# Patient Record
Sex: FEMALE | Race: WHITE | NOT HISPANIC OR LATINO | URBAN - METROPOLITAN AREA
[De-identification: names, ages, dates, MRNs, and addresses within clinical notes are randomized per-mention and may not be internally consistent; named-entity substitution may affect disease eponyms.]

---

## 2017-09-18 ENCOUNTER — IMPORTED ENCOUNTER (OUTPATIENT)
Dept: URBAN - METROPOLITAN AREA CLINIC 50 | Facility: CLINIC | Age: 72
End: 2017-09-18

## 2017-09-19 ENCOUNTER — IMPORTED ENCOUNTER (OUTPATIENT)
Dept: URBAN - METROPOLITAN AREA CLINIC 50 | Facility: CLINIC | Age: 72
End: 2017-09-19

## 2019-07-08 ENCOUNTER — IMPORTED ENCOUNTER (OUTPATIENT)
Dept: URBAN - METROPOLITAN AREA CLINIC 50 | Facility: CLINIC | Age: 74
End: 2019-07-08

## 2020-04-20 ENCOUNTER — IMPORTED ENCOUNTER (OUTPATIENT)
Dept: URBAN - METROPOLITAN AREA CLINIC 50 | Facility: CLINIC | Age: 75
End: 2020-04-20

## 2020-04-21 ENCOUNTER — IMPORTED ENCOUNTER (OUTPATIENT)
Dept: URBAN - METROPOLITAN AREA CLINIC 50 | Facility: CLINIC | Age: 75
End: 2020-04-21

## 2020-06-05 ENCOUNTER — IMPORTED ENCOUNTER (OUTPATIENT)
Dept: URBAN - METROPOLITAN AREA CLINIC 50 | Facility: CLINIC | Age: 75
End: 2020-06-05

## 2020-09-23 NOTE — PATIENT DISCUSSION
Surgery Counseling: I have discussed the option of scheduling surgery versus following, as well as the risks, benefits and alternatives of cataract surgery with the patient. It was explained that the surgery is medically indicated at this time, and it can be performed at the patient's option as delaying will cause no further deterioration, therefore there is no rush and there is no harm in waiting to have surgery. It was also explained that there is no guarantee that removing the cataract will improve their vision. The patient understands and desires to proceed with cataract surgery with the implantation of an intraocular lens to improve vision for __driving / reading__. I have given the patient the prescribed regimen of the all-in-one drop to use before and after cataract surgery. They have elected to use the all-in-one option of Pred/Gati/Brom(prednisolone acetate,gatifloxacin,and bromfenac. Patient to administer as directed.

## 2020-09-23 NOTE — PATIENT DISCUSSION
CATARACTS, OU - VISUALLY SIGNIFICANT. SCHEDULE _OS_ FIRST THEN LATER IN _OD_ DISCUSSED OPTION OF __STANDARD OU___VS___TORIC OS STANDARD OD. PATIENT UNDERSTANDS AND DESIRES ___TORIC OS AND STANDARD OD ___.

## 2020-10-20 NOTE — PATIENT DISCUSSION
Pre-Op 2nd Eye Counseling: The patient has noticed an improvement in their visual symptoms in the operative eye. The patient complains of decreased vision in the fellow eye when reading, driving and watching TV_. It was explained to the patient that the decision to proceed with cataract surgery in the fellow eye is entirely a separate decision from the surgical eye. All of the same risks, benefits and alternatives are reviewed with the patient again. The patient does feel the vision in the non-operative eye is limiting their daily activities and elects to proceed with cataract surgery in the _right______ eye. . I have given the patient the prescribed regimen of  drops to use before and after cataract surgery. Patient to administer as directed.

## 2020-10-20 NOTE — PATIENT DISCUSSION
S/P PE IOL, OS___. DOING WELL. CONTINUE PRED-GATI-BROM IN THE SURGICAL EYE  FOR A TOTAL OF 3 WEEKS USE THEN DISCONTINUE. PATIENT DESIRES _TORIC_IOL FOR 2ND EYE. SCHEDULE CATARACT SURGERY.

## 2021-04-18 ASSESSMENT — TONOMETRY
OS_IOP_MMHG: 13
OD_IOP_MMHG: 13
OD_IOP_MMHG: 12
OS_IOP_MMHG: 12
OD_IOP_MMHG: 12
OS_IOP_MMHG: 15
OD_IOP_MMHG: 15
OS_IOP_MMHG: 11

## 2021-04-18 ASSESSMENT — VISUAL ACUITY
OD_CC: J1
OS_CC: J1
OS_CC: J1@ 16 IN
OD_CC: 20/25
OD_OTHER: 20/40. 20/40.
OS_BAT: 20/20
OS_CC: 20/25-3
OD_OTHER: 20/25. 20/30.
OD_CC: 20/25-3
OD_BAT: 20/40
OD_CC: 20/30+
OS_CC: 20/25
OD_OTHER: 20/50. 20/70.
OS_BAT: 20/30
OS_OTHER: 20/20. 20/25.
OD_BAT: 20/50
OS_CC: 20/30+
OD_BAT: 20/25
OD_CC: J1@ 16 IN
OS_OTHER: 20/30. 20/50.
OS_OTHER: >20/400.
OS_BAT: >20/400
OS_CC: 20/20-1
OD_CC: 20/30

## 2021-08-26 ENCOUNTER — PREPPED CHART (OUTPATIENT)
Dept: URBAN - METROPOLITAN AREA CLINIC 52 | Facility: CLINIC | Age: 76
End: 2021-08-26

## 2021-08-27 ENCOUNTER — ANNUAL COMPREHENSIVE EXAM (OUTPATIENT)
Dept: URBAN - METROPOLITAN AREA CLINIC 52 | Facility: CLINIC | Age: 76
End: 2021-08-27

## 2021-08-27 DIAGNOSIS — H02.834: ICD-10-CM

## 2021-08-27 DIAGNOSIS — H43.813: ICD-10-CM

## 2021-08-27 DIAGNOSIS — H57.813: ICD-10-CM

## 2021-08-27 DIAGNOSIS — H25.13: ICD-10-CM

## 2021-08-27 DIAGNOSIS — H02.831: ICD-10-CM

## 2021-08-27 DIAGNOSIS — H35.361: ICD-10-CM

## 2021-08-27 PROCEDURE — 92134 CPTRZ OPH DX IMG PST SGM RTA: CPT

## 2021-08-27 PROCEDURE — 92014 COMPRE OPH EXAM EST PT 1/>: CPT

## 2021-08-27 ASSESSMENT — VISUAL ACUITY
OD_GLARE: 20/80
OS_GLARE: >20/400
OD_CC: 20/30-
OS_GLARE: 20/40
OD_PH: 20/25-
OS_CC: 20/30-
OD_GLARE: 20/40
OU_CC: J1+/-2

## 2021-08-27 ASSESSMENT — TONOMETRY
OS_IOP_MMHG: 14
OD_IOP_MMHG: 14

## 2022-06-16 ENCOUNTER — ESTABLISHED PATIENT (OUTPATIENT)
Dept: URBAN - METROPOLITAN AREA CLINIC 49 | Facility: CLINIC | Age: 77
End: 2022-06-16

## 2022-06-16 DIAGNOSIS — H10.13: ICD-10-CM

## 2022-06-16 DIAGNOSIS — H35.62: ICD-10-CM

## 2022-06-16 DIAGNOSIS — H16.223: ICD-10-CM

## 2022-06-16 DIAGNOSIS — H25.13: ICD-10-CM

## 2022-06-16 PROCEDURE — 92012 INTRM OPH EXAM EST PATIENT: CPT

## 2022-06-16 RX ORDER — OLOPATADINE HYDROCHLORIDE 2 MG/ML: 1 SOLUTION OPHTHALMIC ONCE A DAY

## 2022-06-16 ASSESSMENT — VISUAL ACUITY
OS_PH: 20/25
OS_CC: 20/30
OD_CC: 20/30
OD_PH: 20/25

## 2022-06-16 ASSESSMENT — TONOMETRY
OD_IOP_MMHG: 12
OS_IOP_MMHG: 13

## 2022-06-16 NOTE — PATIENT DISCUSSION
Patchy sub conj heme 4-6 oclock and 9-8 oclock at todays visit. Discussed that it will get worse before it gets better.

## 2023-04-17 ENCOUNTER — COMPREHENSIVE EXAM (OUTPATIENT)
Dept: URBAN - METROPOLITAN AREA CLINIC 49 | Facility: CLINIC | Age: 78
End: 2023-04-17

## 2023-04-17 DIAGNOSIS — H35.361: ICD-10-CM

## 2023-04-17 DIAGNOSIS — H35.54: ICD-10-CM

## 2023-04-17 DIAGNOSIS — H25.13: ICD-10-CM

## 2023-04-17 PROCEDURE — 92014 COMPRE OPH EXAM EST PT 1/>: CPT

## 2023-04-17 PROCEDURE — 92134 CPTRZ OPH DX IMG PST SGM RTA: CPT

## 2023-04-17 ASSESSMENT — VISUAL ACUITY
OD_GLARE: 20/60
OS_GLARE: 20/50
OU_CC: J2 @ 16"
OS_CC: 20/25
OD_GLARE: 20/40
OD_CC: 20/25-1

## 2023-04-17 ASSESSMENT — TONOMETRY
OS_IOP_MMHG: 12
OD_IOP_MMHG: 12

## 2024-04-24 ENCOUNTER — COMPREHENSIVE EXAM (OUTPATIENT)
Dept: URBAN - METROPOLITAN AREA CLINIC 49 | Facility: LOCATION | Age: 79
End: 2024-04-24

## 2024-04-24 DIAGNOSIS — H18.593: ICD-10-CM

## 2024-04-24 DIAGNOSIS — H25.13: ICD-10-CM

## 2024-04-24 DIAGNOSIS — H52.4: ICD-10-CM

## 2024-04-24 DIAGNOSIS — H43.813: ICD-10-CM

## 2024-04-24 DIAGNOSIS — H35.361: ICD-10-CM

## 2024-04-24 DIAGNOSIS — H02.834: ICD-10-CM

## 2024-04-24 DIAGNOSIS — H02.831: ICD-10-CM

## 2024-04-24 PROCEDURE — 99214 OFFICE O/P EST MOD 30 MIN: CPT

## 2024-04-24 PROCEDURE — 92015 DETERMINE REFRACTIVE STATE: CPT

## 2024-04-24 PROCEDURE — 92134 CPTRZ OPH DX IMG PST SGM RTA: CPT

## 2024-04-24 ASSESSMENT — VISUAL ACUITY
OD_PH: 20/30
OS_CC: 20/40-1
OD_CC: 20/50-2
OS_GLARE: 20/50
OD_GLARE: 20/40
OS_GLARE: 20/60
OU_CC: J2 @ 15IN
OD_GLARE: 20/60-1

## 2024-04-24 ASSESSMENT — TONOMETRY
OD_IOP_MMHG: 13
OS_IOP_MMHG: 14

## 2024-05-07 ENCOUNTER — PRE-OP/H&P (OUTPATIENT)
Dept: URBAN - METROPOLITAN AREA CLINIC 49 | Facility: LOCATION | Age: 79
End: 2024-05-07

## 2024-05-07 VITALS — HEIGHT: 55 IN

## 2024-05-07 DIAGNOSIS — H25.813: ICD-10-CM

## 2024-05-07 PROCEDURE — 99214 OFFICE O/P EST MOD 30 MIN: CPT

## 2024-05-07 PROCEDURE — 92136 OPHTHALMIC BIOMETRY: CPT

## 2024-05-07 PROCEDURE — 92025IOL CORNEAL TOPOGRAPHY PREMIUM IOL

## 2024-05-07 ASSESSMENT — VISUAL ACUITY
OS_CC: 20/40
OD_CC: 20/25-1
OS_PH: 20/25-1
OU_CC: 20/25-1

## 2024-05-07 ASSESSMENT — TONOMETRY
OS_IOP_MMHG: 09
OD_IOP_MMHG: 09

## 2024-05-16 ENCOUNTER — SURGERY/PROCEDURE (OUTPATIENT)
Dept: URBAN - METROPOLITAN AREA SURGERY 16 | Facility: SURGERY | Age: 79
End: 2024-05-16

## 2024-05-16 DIAGNOSIS — H25.811: ICD-10-CM

## 2024-05-16 PROCEDURE — 99199PCV CUSTOM VISION

## 2024-05-16 PROCEDURE — 66984CV REMOVE CATARACT, INSERT LENS, CUSTOM VISION

## 2024-05-17 ENCOUNTER — POST-OP (OUTPATIENT)
Dept: URBAN - METROPOLITAN AREA CLINIC 53 | Facility: CLINIC | Age: 79
End: 2024-05-17

## 2024-05-17 DIAGNOSIS — Z96.1: ICD-10-CM

## 2024-05-17 DIAGNOSIS — Z98.41: ICD-10-CM

## 2024-05-17 ASSESSMENT — VISUAL ACUITY: OD_SC: 20/20-2

## 2024-05-17 ASSESSMENT — TONOMETRY: OD_IOP_MMHG: 14

## 2024-05-21 ENCOUNTER — POST OP/EVAL OF SECOND EYE (OUTPATIENT)
Dept: URBAN - METROPOLITAN AREA CLINIC 49 | Facility: LOCATION | Age: 79
End: 2024-05-21

## 2024-05-21 DIAGNOSIS — H25.812: ICD-10-CM

## 2024-05-21 DIAGNOSIS — Z98.41: ICD-10-CM

## 2024-05-21 PROCEDURE — 99024 POSTOP FOLLOW-UP VISIT: CPT

## 2024-05-21 ASSESSMENT — KERATOMETRY
OD_K2POWER_DIOPTERS: 43.25
OS_K1POWER_DIOPTERS: 45.25
OS_K2POWER_DIOPTERS: 44.74
OD_AXISANGLE_DEGREES: 175
OD_K1POWER_DIOPTERS: 44.75
OS_AXISANGLE2_DEGREES: 65
OD_AXISANGLE2_DEGREES: 85
OS_AXISANGLE_DEGREES: 155

## 2024-05-21 ASSESSMENT — VISUAL ACUITY
OS_GLARE: 20/30
OS_GLARE: 20/30
OD_SC: J16@14"
OS_PH: 20/30
OD_SC: 20/20-2
OS_CC: 20/40
OS_CC: J1@14"

## 2024-05-21 ASSESSMENT — TONOMETRY
OS_IOP_MMHG: 10
OD_IOP_MMHG: 10

## 2024-05-30 ENCOUNTER — SURGERY/PROCEDURE (OUTPATIENT)
Dept: URBAN - METROPOLITAN AREA SURGERY 16 | Facility: SURGERY | Age: 79
End: 2024-05-30

## 2024-05-30 DIAGNOSIS — H25.812: ICD-10-CM

## 2024-05-30 PROCEDURE — 99199PCV CUSTOM VISION

## 2024-05-30 PROCEDURE — 66984CV REMOVE CATARACT, INSERT LENS, CUSTOM VISION: Mod: 79,LT

## 2024-05-30 ASSESSMENT — KERATOMETRY
OS_AXISANGLE2_DEGREES: 65
OD_AXISANGLE_DEGREES: 175
OD_AXISANGLE2_DEGREES: 85
OS_K2POWER_DIOPTERS: 44.74
OD_K1POWER_DIOPTERS: 44.75
OD_K2POWER_DIOPTERS: 43.25
OS_AXISANGLE_DEGREES: 155
OS_K1POWER_DIOPTERS: 45.25

## 2024-05-31 ENCOUNTER — POST-OP (OUTPATIENT)
Dept: URBAN - METROPOLITAN AREA CLINIC 53 | Facility: CLINIC | Age: 79
End: 2024-05-31

## 2024-05-31 DIAGNOSIS — Z98.42: ICD-10-CM

## 2024-05-31 DIAGNOSIS — Z96.1: ICD-10-CM

## 2024-05-31 ASSESSMENT — TONOMETRY: OS_IOP_MMHG: 19

## 2024-05-31 ASSESSMENT — VISUAL ACUITY: OS_SC: 20/25-2

## 2024-06-05 ENCOUNTER — POST-OP (OUTPATIENT)
Dept: URBAN - METROPOLITAN AREA CLINIC 53 | Facility: CLINIC | Age: 79
End: 2024-06-05

## 2024-06-05 DIAGNOSIS — Z96.1: ICD-10-CM

## 2024-06-05 DIAGNOSIS — Z98.42: ICD-10-CM

## 2024-06-05 PROCEDURE — 99024 POSTOP FOLLOW-UP VISIT: CPT

## 2024-06-05 ASSESSMENT — VISUAL ACUITY
OS_SC: 20/25
OU_SC: 20/25
OD_SC: 20/25

## 2024-06-05 ASSESSMENT — TONOMETRY
OS_IOP_MMHG: 15
OD_IOP_MMHG: 16

## 2024-07-10 ENCOUNTER — POST-OP (OUTPATIENT)
Dept: URBAN - METROPOLITAN AREA CLINIC 53 | Facility: CLINIC | Age: 79
End: 2024-07-10

## 2024-07-10 DIAGNOSIS — Z98.42: ICD-10-CM

## 2024-07-10 DIAGNOSIS — Z98.41: ICD-10-CM

## 2024-07-10 DIAGNOSIS — Z96.1: ICD-10-CM

## 2024-07-10 PROCEDURE — 99024 POSTOP FOLLOW-UP VISIT: CPT

## 2024-07-10 ASSESSMENT — KERATOMETRY
OD_AXISANGLE2_DEGREES: 177
OS_K2POWER_DIOPTERS: 46.50
OD_K2POWER_DIOPTERS: 44.75
OD_AXISANGLE_DEGREES: 087
OS_K1POWER_DIOPTERS: 44.75
OD_K1POWER_DIOPTERS: 43.50
OS_AXISANGLE_DEGREES: 055
OS_AXISANGLE2_DEGREES: 145

## 2024-07-10 ASSESSMENT — TONOMETRY
OD_IOP_MMHG: 13
OS_IOP_MMHG: 14

## 2024-07-10 ASSESSMENT — VISUAL ACUITY
OD_SC: 20/25
OD_SC: 20/25@18-20"
OS_SC: 20/25-2
OU_SC: 20/25@18-20"

## 2025-05-20 ENCOUNTER — COMPREHENSIVE EXAM (OUTPATIENT)
Age: 80
End: 2025-05-20

## 2025-05-20 DIAGNOSIS — H52.203: ICD-10-CM

## 2025-05-20 DIAGNOSIS — H35.3111: ICD-10-CM

## 2025-05-20 DIAGNOSIS — Z01.01: ICD-10-CM

## 2025-05-20 DIAGNOSIS — H02.831: ICD-10-CM

## 2025-05-20 DIAGNOSIS — H43.813: ICD-10-CM

## 2025-05-20 DIAGNOSIS — H52.13: ICD-10-CM

## 2025-05-20 DIAGNOSIS — H18.593: ICD-10-CM

## 2025-05-20 DIAGNOSIS — H02.834: ICD-10-CM

## 2025-05-20 DIAGNOSIS — H16.223: ICD-10-CM

## 2025-05-20 DIAGNOSIS — H52.4: ICD-10-CM

## 2025-05-20 DIAGNOSIS — H57.813: ICD-10-CM

## 2025-05-20 DIAGNOSIS — H26.493: ICD-10-CM

## 2025-05-20 PROCEDURE — 92014 COMPRE OPH EXAM EST PT 1/>: CPT

## 2025-05-20 PROCEDURE — 92134 CPTRZ OPH DX IMG PST SGM RTA: CPT | Mod: NC

## 2025-05-20 PROCEDURE — 92015 DETERMINE REFRACTIVE STATE: CPT
